# Patient Record
Sex: MALE | Race: WHITE | NOT HISPANIC OR LATINO
[De-identification: names, ages, dates, MRNs, and addresses within clinical notes are randomized per-mention and may not be internally consistent; named-entity substitution may affect disease eponyms.]

---

## 2017-10-23 ENCOUNTER — APPOINTMENT (OUTPATIENT)
Dept: DERMATOLOGY | Facility: CLINIC | Age: 52
End: 2017-10-23
Payer: COMMERCIAL

## 2017-10-23 PROCEDURE — D0125: CPT

## 2017-10-23 PROCEDURE — 99214 OFFICE O/P EST MOD 30 MIN: CPT

## 2017-12-21 ENCOUNTER — APPOINTMENT (OUTPATIENT)
Dept: DERMATOLOGY | Facility: CLINIC | Age: 52
End: 2017-12-21

## 2017-12-29 ENCOUNTER — APPOINTMENT (OUTPATIENT)
Dept: DERMATOLOGY | Facility: CLINIC | Age: 52
End: 2017-12-29

## 2018-01-09 ENCOUNTER — APPOINTMENT (OUTPATIENT)
Dept: DERMATOLOGY | Facility: CLINIC | Age: 53
End: 2018-01-09

## 2018-04-10 ENCOUNTER — APPOINTMENT (OUTPATIENT)
Dept: DERMATOLOGY | Facility: CLINIC | Age: 53
End: 2018-04-10
Payer: COMMERCIAL

## 2018-04-10 PROCEDURE — 99212 OFFICE O/P EST SF 10 MIN: CPT

## 2018-08-07 ENCOUNTER — APPOINTMENT (OUTPATIENT)
Dept: DERMATOLOGY | Facility: CLINIC | Age: 53
End: 2018-08-07
Payer: COMMERCIAL

## 2018-08-07 PROCEDURE — 17110 DESTRUCTION B9 LES UP TO 14: CPT

## 2018-08-07 PROCEDURE — 99213 OFFICE O/P EST LOW 20 MIN: CPT | Mod: 25

## 2019-02-20 ENCOUNTER — RESULT REVIEW (OUTPATIENT)
Age: 54
End: 2019-02-20

## 2019-02-20 ENCOUNTER — APPOINTMENT (OUTPATIENT)
Dept: DERMATOLOGY | Facility: CLINIC | Age: 54
End: 2019-02-20
Payer: COMMERCIAL

## 2019-02-20 PROCEDURE — 17110 DESTRUCTION B9 LES UP TO 14: CPT

## 2019-02-20 PROCEDURE — 99214 OFFICE O/P EST MOD 30 MIN: CPT | Mod: 25

## 2019-02-20 PROCEDURE — 17000 DESTRUCT PREMALG LESION: CPT | Mod: 59

## 2019-02-20 PROCEDURE — 11102 TANGNTL BX SKIN SINGLE LES: CPT | Mod: 59

## 2019-02-20 PROCEDURE — 17003 DESTRUCT PREMALG LES 2-14: CPT

## 2019-04-18 ENCOUNTER — APPOINTMENT (OUTPATIENT)
Dept: DERMATOLOGY | Facility: CLINIC | Age: 54
End: 2019-04-18
Payer: COMMERCIAL

## 2019-04-18 DIAGNOSIS — C44.310 BASAL CELL CARCINOMA OF SKIN OF UNSPECIFIED PARTS OF FACE: ICD-10-CM

## 2019-04-18 PROCEDURE — 17312 MOHS ADDL STAGE: CPT

## 2019-04-18 PROCEDURE — 13132 CMPLX RPR F/C/C/M/N/AX/G/H/F: CPT

## 2019-04-18 PROCEDURE — 17311 MOHS 1 STAGE H/N/HF/G: CPT

## 2019-07-01 ENCOUNTER — APPOINTMENT (OUTPATIENT)
Dept: DERMATOLOGY | Facility: CLINIC | Age: 54
End: 2019-07-01
Payer: COMMERCIAL

## 2019-07-01 PROCEDURE — 17000 DESTRUCT PREMALG LESION: CPT

## 2019-07-01 PROCEDURE — 99214 OFFICE O/P EST MOD 30 MIN: CPT | Mod: 25

## 2019-09-04 ENCOUNTER — APPOINTMENT (OUTPATIENT)
Dept: ORTHOPEDIC SURGERY | Facility: CLINIC | Age: 54
End: 2019-09-04
Payer: COMMERCIAL

## 2019-09-04 VITALS
DIASTOLIC BLOOD PRESSURE: 79 MMHG | BODY MASS INDEX: 31.52 KG/M2 | WEIGHT: 208 LBS | HEART RATE: 76 BPM | HEIGHT: 68 IN | SYSTOLIC BLOOD PRESSURE: 165 MMHG

## 2019-09-04 DIAGNOSIS — Z80.9 FAMILY HISTORY OF MALIGNANT NEOPLASM, UNSPECIFIED: ICD-10-CM

## 2019-09-04 DIAGNOSIS — M79.89 OTHER SPECIFIED SOFT TISSUE DISORDERS: ICD-10-CM

## 2019-09-04 DIAGNOSIS — Z78.9 OTHER SPECIFIED HEALTH STATUS: ICD-10-CM

## 2019-09-04 PROCEDURE — 99203 OFFICE O/P NEW LOW 30 MIN: CPT

## 2019-09-11 PROBLEM — Z80.9 FAMILY HISTORY OF MALIGNANT NEOPLASM: Status: ACTIVE | Noted: 2019-09-09

## 2019-09-11 PROBLEM — Z78.9 NO PERTINENT PAST MEDICAL HISTORY: Status: RESOLVED | Noted: 2019-09-09 | Resolved: 2019-09-11
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
## 2019-09-11 NOTE — PHYSICAL EXAM
[de-identified] : Ambulating with a slightly antalgic to antalgic gait.  Station:  Normal.  [de-identified] : Left Calf:\par The patient does have swelling to the calf.  \par \par Left Knee: \par Range of Motion in Degrees	\par 	                  Claimant:	Normal:	\par Flexion Active	  135 	                135-degrees	\par Flexion Passive	  135	                135-degrees	\par Extension Active	  0-5	                0-5-degrees	\par Extension Passive	  0-5	                0-5-degrees	\par \par No weakness to flexion/extension.  No evidence of instability in the AP plane or varus or valgus stress.  Negative  Lachman.  Negative pivot shift.  Negative anterior drawer test.  Negative posterior drawer test.  Negative Cristiano.  Negative Apley grind.  No medial or lateral joint line tenderness.  No tenderness over the medial and lateral facet of the patella.  No patellofemoral crepitations.  No lateral tilting patella.  No patellar apprehension.  No crepitation in the medial and lateral femoral condyle.  No proximal or distal swelling, edema or tenderness.  No gross motor or sensory deficits.  No intra-articular swelling.  2+ DP and PT pulses. No varus or valgus malalignment.  Skin is intact.  No rashes, scars or lesions.  \par  \par Left Ankle: \par Range of Motion in Degrees:\par 	                                Claimant:	                Normal:	\par Dorsiflexion (Active)	40-degrees	40-degrees	\par Dorsiflexion (Passive)	40-degrees	40-degrees	\par Plantar (Active)	                40-degrees	40-degrees	\par Plantar (Passive)	                40-degrees	40-degrees	\par Inversion (Active)	                30-degrees	30-degrees	\par Inversion (Passive)	                30-degrees	30-degrees	\par Eversion  (Active)	                20-degrees	20-degrees	\par Eversion (Passive) 	                20-degrees	20-degrees	\par \par No weakness in dorsiflexion, plantar flexion, inversion or eversion.  Normal sensation.  No tenderness over the medial or lateral ligaments.  No tenderness over the DLES.  No evidence of instability.  Negative anterior drawer sign.  No medial or lateral bony tenderness.  No proximal fibular tenderness.  No anterior, posterior, medial or lateral tendon tenderness.  No intra-articular swelling.  No extra-articular swelling, edema or tenderness.  No tenderness over the plantar aspect of the os calcis.  2+ DP and PT pulses. Skin is intact.  No rashes, scars or lesions.  \par \par Right Knee: \par Range of Motion in Degrees	\par 	                  Claimant:	Normal:	\par Flexion Active	  135 	                135-degrees	\par Flexion Passive	  135	                135-degrees	\par Extension Active	  0-5	                0-5-degrees	\par Extension Passive	  0-5	                0-5-degrees	\par \par No weakness to flexion/extension.  No evidence of instability in the AP plane or varus or valgus stress.  Negative  Lachman.  Negative pivot shift.  Negative anterior drawer test.  Negative posterior drawer test.  Negative Cristiano.  Negative Apley grind.  No medial or lateral joint line tenderness.  No tenderness over the medial and lateral facet of the patella.  No patellofemoral crepitations.  No lateral tilting patella.  No patellar apprehension.  No crepitation in the medial and lateral femoral condyle.  No proximal or distal swelling, edema or tenderness.  No gross motor or sensory deficits.  No intra-articular swelling.  2+ DP and PT pulses. No varus or valgus malalignment.  Skin is intact.  No rashes, scars or lesions.  \par  \par Right Ankle: \par Range of Motion in Degrees:\par 	                                Claimant:	                Normal:	\par Dorsiflexion (Active)	40-degrees	40-degrees	\par Dorsiflexion (Passive)	40-degrees	40-degrees	\par Plantar (Active)	                40-degrees	40-degrees	\par Plantar (Passive)	                40-degrees	40-degrees	\par Inversion (Active)	                30-degrees	30-degrees	\par Inversion (Passive)	                30-degrees	30-degrees	\par Eversion  (Active)	                20-degrees	20-degrees	\par Eversion (Passive) 	                20-degrees	20-degrees	\par \par No weakness in dorsiflexion, plantar flexion, inversion or eversion.  Normal sensation.  No tenderness over the medial or lateral ligaments.  No tenderness over the DLES.  No evidence of instability.  Negative anterior drawer sign.  No medial or lateral bony tenderness.  No proximal fibular tenderness.  No anterior, posterior, medial or lateral tendon tenderness.  No intra-articular swelling.  No extra-articular swelling, edema or tenderness.  No tenderness over the plantar aspect of the os calcis.  2+ DP and PT pulses. Skin is intact.  No rashes, scars or lesions.  \par   [de-identified] : Appearance:  Well-developed, well-nourished male in no acute distress.\par \par   [de-identified] : [facility] and knee, were all negative for any fractures or dislocations.  Doppler was negative.
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
## 2019-09-11 NOTE — ADDENDUM
[FreeTextEntry1] : This note was written by Alycia Payton on 09/11/2019 acting as a scribe for ABRAN CHOWDHURY

## 2019-12-07 ENCOUNTER — EMERGENCY (EMERGENCY)
Facility: HOSPITAL | Age: 54
LOS: 1 days | Discharge: HOME | End: 2019-12-07
Attending: EMERGENCY MEDICINE
Payer: COMMERCIAL

## 2019-12-07 VITALS
SYSTOLIC BLOOD PRESSURE: 111 MMHG | DIASTOLIC BLOOD PRESSURE: 70 MMHG | HEIGHT: 67 IN | RESPIRATION RATE: 20 BRPM | HEART RATE: 64 BPM | TEMPERATURE: 97 F | WEIGHT: 207.9 LBS | OXYGEN SATURATION: 97 %

## 2019-12-07 LAB
ALBUMIN SERPL ELPH-MCNC: 4.2 G/DL — SIGNIFICANT CHANGE UP (ref 3.3–5.2)
ALP SERPL-CCNC: 77 U/L — SIGNIFICANT CHANGE UP (ref 40–120)
ALT FLD-CCNC: 53 U/L — HIGH
ANION GAP SERPL CALC-SCNC: 14 MMOL/L — SIGNIFICANT CHANGE UP (ref 5–17)
APTT BLD: 30.9 SEC — SIGNIFICANT CHANGE UP (ref 27.5–36.3)
AST SERPL-CCNC: 35 U/L — SIGNIFICANT CHANGE UP
BASOPHILS # BLD AUTO: 0.04 K/UL — SIGNIFICANT CHANGE UP (ref 0–0.2)
BASOPHILS NFR BLD AUTO: 0.8 % — SIGNIFICANT CHANGE UP (ref 0–2)
BILIRUB SERPL-MCNC: 0.3 MG/DL — LOW (ref 0.4–2)
BLD GP AB SCN SERPL QL: SIGNIFICANT CHANGE UP
BUN SERPL-MCNC: 21 MG/DL — HIGH (ref 8–20)
CALCIUM SERPL-MCNC: 9.4 MG/DL — SIGNIFICANT CHANGE UP (ref 8.6–10.2)
CHLORIDE SERPL-SCNC: 96 MMOL/L — LOW (ref 98–107)
CO2 SERPL-SCNC: 25 MMOL/L — SIGNIFICANT CHANGE UP (ref 22–29)
CREAT SERPL-MCNC: 0.87 MG/DL — SIGNIFICANT CHANGE UP (ref 0.5–1.3)
EOSINOPHIL # BLD AUTO: 0.33 K/UL — SIGNIFICANT CHANGE UP (ref 0–0.5)
EOSINOPHIL NFR BLD AUTO: 6.4 % — HIGH (ref 0–6)
ETHANOL SERPL-MCNC: 279 MG/DL — SIGNIFICANT CHANGE UP
GLUCOSE SERPL-MCNC: 100 MG/DL — SIGNIFICANT CHANGE UP (ref 70–115)
HCT VFR BLD CALC: 40.5 % — SIGNIFICANT CHANGE UP (ref 39–50)
HGB BLD-MCNC: 13.8 G/DL — SIGNIFICANT CHANGE UP (ref 13–17)
IMM GRANULOCYTES NFR BLD AUTO: 0.4 % — SIGNIFICANT CHANGE UP (ref 0–1.5)
INR BLD: 1.06 RATIO — SIGNIFICANT CHANGE UP (ref 0.88–1.16)
LIDOCAIN IGE QN: 39 U/L — SIGNIFICANT CHANGE UP (ref 22–51)
LYMPHOCYTES # BLD AUTO: 1.54 K/UL — SIGNIFICANT CHANGE UP (ref 1–3.3)
LYMPHOCYTES # BLD AUTO: 30.1 % — SIGNIFICANT CHANGE UP (ref 13–44)
MCHC RBC-ENTMCNC: 29.3 PG — SIGNIFICANT CHANGE UP (ref 27–34)
MCHC RBC-ENTMCNC: 34.1 GM/DL — SIGNIFICANT CHANGE UP (ref 32–36)
MCV RBC AUTO: 86 FL — SIGNIFICANT CHANGE UP (ref 80–100)
MONOCYTES # BLD AUTO: 0.52 K/UL — SIGNIFICANT CHANGE UP (ref 0–0.9)
MONOCYTES NFR BLD AUTO: 10.2 % — SIGNIFICANT CHANGE UP (ref 2–14)
NEUTROPHILS # BLD AUTO: 2.67 K/UL — SIGNIFICANT CHANGE UP (ref 1.8–7.4)
NEUTROPHILS NFR BLD AUTO: 52.1 % — SIGNIFICANT CHANGE UP (ref 43–77)
PLATELET # BLD AUTO: 209 K/UL — SIGNIFICANT CHANGE UP (ref 150–400)
POTASSIUM SERPL-MCNC: 4.8 MMOL/L — SIGNIFICANT CHANGE UP (ref 3.5–5.3)
POTASSIUM SERPL-SCNC: 4.8 MMOL/L — SIGNIFICANT CHANGE UP (ref 3.5–5.3)
PROT SERPL-MCNC: 7.3 G/DL — SIGNIFICANT CHANGE UP (ref 6.6–8.7)
PROTHROM AB SERPL-ACNC: 12.2 SEC — SIGNIFICANT CHANGE UP (ref 10–12.9)
RBC # BLD: 4.71 M/UL — SIGNIFICANT CHANGE UP (ref 4.2–5.8)
RBC # FLD: 11.8 % — SIGNIFICANT CHANGE UP (ref 10.3–14.5)
SODIUM SERPL-SCNC: 135 MMOL/L — SIGNIFICANT CHANGE UP (ref 135–145)
WBC # BLD: 5.12 K/UL — SIGNIFICANT CHANGE UP (ref 3.8–10.5)
WBC # FLD AUTO: 5.12 K/UL — SIGNIFICANT CHANGE UP (ref 3.8–10.5)

## 2019-12-07 PROCEDURE — 85610 PROTHROMBIN TIME: CPT

## 2019-12-07 PROCEDURE — 74177 CT ABD & PELVIS W/CONTRAST: CPT

## 2019-12-07 PROCEDURE — 85027 COMPLETE CBC AUTOMATED: CPT

## 2019-12-07 PROCEDURE — 99284 EMERGENCY DEPT VISIT MOD MDM: CPT

## 2019-12-07 PROCEDURE — 70450 CT HEAD/BRAIN W/O DYE: CPT

## 2019-12-07 PROCEDURE — 83690 ASSAY OF LIPASE: CPT

## 2019-12-07 PROCEDURE — 70450 CT HEAD/BRAIN W/O DYE: CPT | Mod: 26

## 2019-12-07 PROCEDURE — 71260 CT THORAX DX C+: CPT | Mod: 26

## 2019-12-07 PROCEDURE — 72125 CT NECK SPINE W/O DYE: CPT

## 2019-12-07 PROCEDURE — 71260 CT THORAX DX C+: CPT

## 2019-12-07 PROCEDURE — 85730 THROMBOPLASTIN TIME PARTIAL: CPT

## 2019-12-07 PROCEDURE — 80307 DRUG TEST PRSMV CHEM ANLYZR: CPT

## 2019-12-07 PROCEDURE — 86901 BLOOD TYPING SEROLOGIC RH(D): CPT

## 2019-12-07 PROCEDURE — 74177 CT ABD & PELVIS W/CONTRAST: CPT | Mod: 26

## 2019-12-07 PROCEDURE — 86900 BLOOD TYPING SEROLOGIC ABO: CPT

## 2019-12-07 PROCEDURE — 72125 CT NECK SPINE W/O DYE: CPT | Mod: 26

## 2019-12-07 PROCEDURE — 86850 RBC ANTIBODY SCREEN: CPT

## 2019-12-07 PROCEDURE — 80053 COMPREHEN METABOLIC PANEL: CPT

## 2019-12-07 PROCEDURE — 99285 EMERGENCY DEPT VISIT HI MDM: CPT | Mod: 25

## 2019-12-07 PROCEDURE — 36415 COLL VENOUS BLD VENIPUNCTURE: CPT
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
## 2019-12-07 NOTE — ED PROVIDER NOTE - PATIENT PORTAL LINK FT
You can access the FollowMyHealth Patient Portal offered by [facility] by registering at the following website: [email or web address]. By joining [facility]’s FollowMyHealth portal, you will also be able to view your health information using other applications (apps) compatible with our system.
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
## 2019-12-07 NOTE — ED PROVIDER NOTE - PROGRESS NOTE DETAILS
Saleem: CT imaging without any traumatic injuries. Incidentally noted RUL nodules, follow up recommended by radiology, pt made aware of these findings and provided with results. Pt ambulatory with a steady gait and clinically sober. Being picked up by friend to get home.

## 2020-01-02 ENCOUNTER — APPOINTMENT (OUTPATIENT)
Dept: DERMATOLOGY | Facility: CLINIC | Age: 55
End: 2020-01-02
Payer: COMMERCIAL

## 2020-01-02 PROBLEM — I10 ESSENTIAL (PRIMARY) HYPERTENSION: Chronic | Status: ACTIVE | Noted: 2019-12-07

## 2020-01-02 PROCEDURE — 99213 OFFICE O/P EST LOW 20 MIN: CPT | Mod: 25

## 2020-01-02 PROCEDURE — 17003 DESTRUCT PREMALG LES 2-14: CPT

## 2020-01-02 PROCEDURE — 17000 DESTRUCT PREMALG LESION: CPT

## 2020-06-23 ENCOUNTER — APPOINTMENT (OUTPATIENT)
Dept: DERMATOLOGY | Facility: CLINIC | Age: 55
End: 2020-06-23
Payer: COMMERCIAL

## 2020-06-23 PROCEDURE — 17003 DESTRUCT PREMALG LES 2-14: CPT

## 2020-06-23 PROCEDURE — 17000 DESTRUCT PREMALG LESION: CPT

## 2020-06-23 PROCEDURE — 99213 OFFICE O/P EST LOW 20 MIN: CPT | Mod: 25

## 2020-10-22 NOTE — ED ADULT TRIAGE NOTE - TEMPERATURE IN CELSIUS (DEGREES C)
----- Message from Nate Vu DO sent at 10/22/2020  7:55 AM CDT -----  The 10-year ASCVD risk score (Kathryn SY Jr., et al., 2013) is: 15.4%    Values used to calculate the score:      Age: 72 years      Sex: Male      Is Non- : No      Diabetic: No      Tobacco smoker: No      Systolic Blood Pressure: 112 mmHg      Is BP treated: No      HDL Cholesterol: 62 mg/dL      Total Cholesterol: 169 mg/dL    Would benefit from statin. We can discuss at next visit. Or if he would like to start atorvastatin 40 mg qhs please order 90 with 3 refills.   36.2

## 2020-12-31 ENCOUNTER — APPOINTMENT (OUTPATIENT)
Dept: DERMATOLOGY | Facility: CLINIC | Age: 55
End: 2020-12-31
Payer: COMMERCIAL

## 2020-12-31 PROCEDURE — 99214 OFFICE O/P EST MOD 30 MIN: CPT

## 2020-12-31 PROCEDURE — 99072 ADDL SUPL MATRL&STAF TM PHE: CPT

## 2021-07-20 ENCOUNTER — APPOINTMENT (OUTPATIENT)
Dept: DERMATOLOGY | Facility: CLINIC | Age: 56
End: 2021-07-20

## 2021-07-26 ENCOUNTER — APPOINTMENT (OUTPATIENT)
Dept: DERMATOLOGY | Facility: CLINIC | Age: 56
End: 2021-07-26
Payer: COMMERCIAL

## 2021-07-26 PROCEDURE — 99213 OFFICE O/P EST LOW 20 MIN: CPT

## 2021-08-03 ENCOUNTER — APPOINTMENT (OUTPATIENT)
Dept: DERMATOLOGY | Facility: CLINIC | Age: 56
End: 2021-08-03

## 2022-01-04 NOTE — ED PROVIDER NOTE - DR. NAME
SCr 1.57 on admission  baseline SCr ~ 0.8  s/p 2.6 L NS bolus  Monitor BMP  Consider Renal US, urine lytes if not improving with IV hydration Saleem

## 2022-01-13 ENCOUNTER — APPOINTMENT (OUTPATIENT)
Dept: DERMATOLOGY | Facility: CLINIC | Age: 57
End: 2022-01-13

## 2022-07-19 ENCOUNTER — APPOINTMENT (OUTPATIENT)
Dept: DERMATOLOGY | Facility: CLINIC | Age: 57
End: 2022-07-19

## 2022-07-19 PROCEDURE — 17003 DESTRUCT PREMALG LES 2-14: CPT

## 2022-07-19 PROCEDURE — 99214 OFFICE O/P EST MOD 30 MIN: CPT | Mod: 25

## 2022-07-19 PROCEDURE — 17000 DESTRUCT PREMALG LESION: CPT

## 2022-07-20 ENCOUNTER — APPOINTMENT (OUTPATIENT)
Dept: ORTHOPEDIC SURGERY | Facility: CLINIC | Age: 57
End: 2022-07-20

## 2022-07-20 VITALS — BODY MASS INDEX: 31.52 KG/M2 | WEIGHT: 208 LBS | HEIGHT: 68 IN

## 2022-07-20 DIAGNOSIS — M19.90 UNSPECIFIED OSTEOARTHRITIS, UNSPECIFIED SITE: ICD-10-CM

## 2022-07-20 DIAGNOSIS — M76.822 POSTERIOR TIBIAL TENDINITIS, LEFT LEG: ICD-10-CM

## 2022-07-20 DIAGNOSIS — Z00.00 ENCOUNTER FOR GENERAL ADULT MEDICAL EXAMINATION W/OUT ABNORMAL FINDINGS: ICD-10-CM

## 2022-07-20 DIAGNOSIS — M25.475 EFFUSION, LEFT FOOT: ICD-10-CM

## 2022-07-20 PROCEDURE — 73630 X-RAY EXAM OF FOOT: CPT | Mod: LT

## 2022-07-20 PROCEDURE — 73600 X-RAY EXAM OF ANKLE: CPT | Mod: LT

## 2022-07-20 PROCEDURE — 99204 OFFICE O/P NEW MOD 45 MIN: CPT

## 2022-07-20 RX ORDER — METHYLPREDNISOLONE 4 MG/1
4 TABLET ORAL
Qty: 1 | Refills: 0 | Status: ACTIVE | COMMUNITY
Start: 2022-07-20 | End: 1900-01-01

## 2022-07-20 NOTE — PHYSICAL EXAM
[Left] : left foot and ankle [Mild] : mild swelling of MTP joint/great toe [1st] : 1st [NL (40)] : plantar flexion 40 degrees [NL 30)] : inversion 30 degrees [NL (20)] : eversion 20 degrees [5___] : [facility] 5[unfilled]/5 [2+] : dorsalis pedis pulse: 2+ [] : patient ambulates without assistive device [de-identified] : MTP joint DF 30 degrees [TWNoteComboBox6] : MTP joint PF 10 degrees

## 2022-07-20 NOTE — IMAGING
[Weight -] : weightbearing [Left] : left foot [There are no fractures, subluxations or dislocations. No significant abnormalities are seen] : There are no fractures, subluxations or dislocations. No significant abnormalities are seen

## 2022-07-20 NOTE — HISTORY OF PRESENT ILLNESS
[8] : 8 [0] : 0 [Burning] : burning [Constant] : constant [de-identified] : 7/20/22: few months foot pain around great toe. no recent injury. h/o foot oa. denies h/o gout. 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

## 2022-07-20 NOTE — ASSESSMENT
[FreeTextEntry1] : concern for gout\par rx for labs given\par mdp\par f/up 2 wks\par \par A Medrol dose Yariel was prescribed today. Patient understands that while taking the Medrol, they shouldn't be taking any other anti-inflammatories. Pt understands and all questions were answered.\par \par

## 2022-07-29 ENCOUNTER — APPOINTMENT (OUTPATIENT)
Dept: ORTHOPEDIC SURGERY | Facility: CLINIC | Age: 57
End: 2022-07-29

## 2022-08-03 ENCOUNTER — APPOINTMENT (OUTPATIENT)
Dept: ORTHOPEDIC SURGERY | Facility: CLINIC | Age: 57
End: 2022-08-03

## 2022-09-29 ENCOUNTER — APPOINTMENT (OUTPATIENT)
Dept: DERMATOLOGY | Facility: CLINIC | Age: 57
End: 2022-09-29

## 2022-09-29 PROCEDURE — 99213 OFFICE O/P EST LOW 20 MIN: CPT | Mod: 25

## 2022-09-29 PROCEDURE — 17110 DESTRUCTION B9 LES UP TO 14: CPT

## 2022-09-29 PROCEDURE — 17000 DESTRUCT PREMALG LESION: CPT | Mod: 59

## 2022-10-27 ENCOUNTER — APPOINTMENT (OUTPATIENT)
Dept: DERMATOLOGY | Facility: CLINIC | Age: 57
End: 2022-10-27

## 2022-10-27 PROCEDURE — 99213 OFFICE O/P EST LOW 20 MIN: CPT | Mod: 25

## 2022-10-27 PROCEDURE — 11900 INJECT SKIN LESIONS </W 7: CPT

## 2023-04-14 ENCOUNTER — RESULT REVIEW (OUTPATIENT)
Age: 58
End: 2023-04-14

## 2023-04-14 ENCOUNTER — APPOINTMENT (OUTPATIENT)
Dept: DERMATOLOGY | Facility: CLINIC | Age: 58
End: 2023-04-14
Payer: COMMERCIAL

## 2023-04-14 PROCEDURE — 11102 TANGNTL BX SKIN SINGLE LES: CPT

## 2023-04-14 PROCEDURE — 99213 OFFICE O/P EST LOW 20 MIN: CPT | Mod: 25

## 2023-07-13 ENCOUNTER — APPOINTMENT (OUTPATIENT)
Dept: DERMATOLOGY | Facility: CLINIC | Age: 58
End: 2023-07-13

## 2023-07-20 ENCOUNTER — APPOINTMENT (OUTPATIENT)
Dept: DERMATOLOGY | Facility: CLINIC | Age: 58
End: 2023-07-20
Payer: COMMERCIAL

## 2023-07-20 ENCOUNTER — RESULT REVIEW (OUTPATIENT)
Age: 58
End: 2023-07-20

## 2023-07-20 PROCEDURE — 99213 OFFICE O/P EST LOW 20 MIN: CPT | Mod: 25

## 2023-07-20 PROCEDURE — 11102 TANGNTL BX SKIN SINGLE LES: CPT

## 2023-08-21 ENCOUNTER — APPOINTMENT (OUTPATIENT)
Dept: DERMATOLOGY | Facility: CLINIC | Age: 58
End: 2023-08-21
Payer: COMMERCIAL

## 2023-08-21 DIAGNOSIS — C43.59 MALIGNANT MELANOMA OF OTHER PART OF TRUNK: ICD-10-CM

## 2023-08-21 PROCEDURE — 12032 INTMD RPR S/A/T/EXT 2.6-7.5: CPT

## 2023-08-21 PROCEDURE — 11603 EXC TR-EXT MAL+MARG 2.1-3 CM: CPT

## 2023-08-21 RX ORDER — CEPHALEXIN 500 MG/1
500 CAPSULE ORAL 3 TIMES DAILY
Qty: 21 | Refills: 0 | Status: ACTIVE | COMMUNITY
Start: 2023-08-21 | End: 1900-01-01

## 2023-08-21 NOTE — END OF VISIT
[] : Resident [FreeTextEntry3] : I personally saw and examined this patient with the resident physician and was present for the key portions of history taking, examination as well as the excision and reconstruction procedures performed .  I agree with the assessment and plan as documented in the resident's note unless noted below.   Lizette Leblanc MD Physician, Dermatology and Dermatologic Surgery [facility]

## 2023-08-21 NOTE — ASSESSMENT
[FreeTextEntry1] : Melanoma, 0.3 mm in thickness, pT1a of the left superior upper back --excision with 1 cm margins performed today, will follow up on final pathology  -- intermediate linear repair performed  -- f/u for wound check PRN  -- keflex 500 mg TID x 7 days, SED  -- f/u for routine skin exams as previously recommended by referring dermatologist   Thank you for this dermatologic surgery referral.   Reason MD Deana  Physician, Dermatology & Dermatologic Surgery  [facility]

## 2023-08-21 NOTE — PROCEDURE
[TextEntry] : Excision Operative Note  Indications:  Alternative therapies were discussed. Given the size, location, and tumor type we decided that excision offers the best option for treatment.  Preoperative diagnosis: melanoma Postoperative diagnosis: Same Location: Left superior upper back  Anesthetic: 1% lidocaine with 1:100,000 epinephrine Antiseptic: Chlorhexidine or Betadine Attending surgeon: Lizette Leblanc MD Assistant(s): Keyon Atkinson RN, Rhianna Soler MA Estimated blood loss: < 5cc  Complications: None Initial lesion size: 0.9 cm x 0.8 cm Surgical margin: 1.0 cm Total excision size (always includes surgical margin):  2.9 x 2.8 cm Closure type: Intermediate linear layered Length of closure: 5.4 cm Suture material: 3-0 Vicryl, 4-0 Chromic   The patient was brought back to the minor surgery operating room. Prior to the procedure the medical record was reviewed by the physician. A pre-procedure checklist in the presence of the patient was reviewed. A surgery " timeout " was observed. The following were confirmed during the surgery timeout: patient name, confirmation of consent, patient position, allergies, type of anesthesia, and antibiotic given (if any, see emr entry for any medications).  The risks of the procedure, including bleeding, infection, nerve damage, possibility of recurrence, failure of the repair, and the certainty of a scar were discussed with the patient. Alternatives to the procedure, as well as their risks and benefits, were also discussed. The patient was offered an opportunity to ask any questions and, after expressing understanding of the proposed procedure and its alternatives, the patient signed the consent form. The patient was placed in appropriate position and the area was prepared and draped in the usual manner. Local anesthesia was obtained with the above mentioned anesthetic. Using a sterile surgical marking pen, an elliptical (or circular) excision was designed around the lesion and along relaxed skin tension lines, if possible, incorporating the margin of normal-appearing skin mentioned above. A full thickness skin incision using a #15 blade Bard-Matthew scalpel was performed and the lesion was excised sharply in the subcutaneous plane.  The specimen was submitted for histopathologic examination.  The defect was moderately undermined in the subcutaneous plane if needed to reduce wound closure tension and allow for easy wound edge eversion.  Hemostasis was maintained with the electrosurgical unit.  Interrupted, inverted, subcuticular buried mattress sutures were placed using the material mentioned above to approximate the dermal edges of the wound. Interrupted and running simple cutaneous sutures were used to further approximate and sylvain the wound edges.  The final length of the repair is listed in the summary above. A firm pressure dressing was applied over vaseline ointment and Telfa. Verbal postoperative wound care instructions were given and a wound care hand out was dispensed.  The patient was asked to follow up for a wound check as specified. The patient was also told to call us at anytime for signs of wound infection or any other concerns they might have regarding the surgery or the healing process.  The patient tolerated the procedure well and ambulated from the operatory without problem.

## 2023-08-21 NOTE — HISTORY OF PRESENT ILLNESS
[FreeTextEntry1] : Melanoma, 0.3 mm in thickness, pT1a of the left superior upper back [de-identified] : Dermatologic Surgery Consultation  08/21/2023  Referred by: Dr. Owen    We had the pleasure of seeing your patient in consultation for Dermatologic Surgery.   Mr. SAULO LIZAMA is a 58 year old M who presents for excision of melanoma, 0.3 mm in thickness, pT1a of the left superior upper back.    Pertinent positives noted below History of HIV or hepatitis: No Blood thinners: No Antibiotic Prophylaxis: None Medical implants: None    The patient's review of systems questionnaire was reviewed. Education needs were identified. There were no barriers to learning.   Dermatologic surgery consultation for melanoma, 0.3mm, Stage pT1a  -- I explained the treatment recommendation of excision with 10 mm margins according to NCCN guidelines -I advised that final pathologic staging is only possible after evaluation of the complete specimen following excision, and may result in a change to the final stage of the melanoma and further treatment and/or workup recommendations including additional excision as well as discussion of SLNB if indicated according to final tumor stage -I explained that following extirpation there will be a full thickness defect of the involved area. The reconstructive options will be based on the defect size and surrounding tissue laxity of the involved area. Primary closure is only possible for smaller defects. For larger defects, local tissue rearrangement or skin grafting may be necessary. Risks following layered primary closure or local tissue rearrangement include wound dehiscence, contour irregularity, bleeding, infection, and paresthesia (nerve damage including sensory deficit or motor weakness). Risks following skin grafting include wound dehiscence, skin graft nonadherence (partial or complete), contour irregularity, bleeding, infection, paresthesia, and donor site complications. I explained that the patient will need to abstain from physical activity for 1-2 weeks following the surgery, that they would heal with a scar, and also discussed the chances of infection, bleeding, potential sensory or motor nerve damage, and recurrence.  The patient indicated that s/he understood the risks and wished to proceed today -- In particular, for reconstruction we discussed linear closure  Thank you for this dermatologic surgery referral. We recommended that the patient follow up with their r referring dermatologist for routine skin exams following surgery.

## 2023-08-21 NOTE — PHYSICAL EXAM
[Alert] : alert [Oriented x 3] : ~L oriented x 3 [Well Nourished] : well nourished [Conjunctiva Non-injected] : conjunctiva non-injected [No Visual Lymphadenopathy] : no visual  lymphadenopathy [No Clubbing] : no clubbing [No Edema] : no edema [No Bromhidrosis] : no bromhidrosis [No Chromhidrosis] : no chromhidrosis [Hair] : Hair [Scalp] : Scalp [Face] : Face [Nose] : Nose [Eyelids] : Eyelids [Ears] : Ears [Lips] : Lips [Neck] : Neck [FreeTextEntry3] : -0.9x0.8 cm pink scar of the Left superior upper back  no LAD

## 2023-09-05 ENCOUNTER — RX ONLY (RX ONLY)
Age: 58
End: 2023-09-05

## 2023-09-05 ENCOUNTER — OFFICE (OUTPATIENT)
Dept: URBAN - METROPOLITAN AREA CLINIC 104 | Facility: CLINIC | Age: 58
Setting detail: OPHTHALMOLOGY
End: 2023-09-05
Payer: COMMERCIAL

## 2023-09-05 DIAGNOSIS — H01.001: ICD-10-CM

## 2023-09-05 DIAGNOSIS — H04.122: ICD-10-CM

## 2023-09-05 DIAGNOSIS — H01.002: ICD-10-CM

## 2023-09-05 DIAGNOSIS — H01.004: ICD-10-CM

## 2023-09-05 DIAGNOSIS — H04.123: ICD-10-CM

## 2023-09-05 PROBLEM — H35.033 HYPERTENSIVE RETINOPATHY; BOTH EYES: Status: ACTIVE | Noted: 2023-09-05

## 2023-09-05 PROBLEM — H02.132 ECTROPION-SENILE; RIGHT LOWER LID, LEFT LOWER LID: Status: ACTIVE | Noted: 2023-09-05

## 2023-09-05 PROBLEM — H01.005 BLEPHARITIS; RIGHT UPPER LID, RIGHT LOWER LID, LEFT UPPER LID, LEFT LOWER LID: Status: ACTIVE | Noted: 2023-09-05

## 2023-09-05 PROBLEM — H25.13 CATARACT SENILE NUCLEAR SCLEROSIS; BOTH EYES: Status: ACTIVE | Noted: 2023-09-05

## 2023-09-05 PROBLEM — H02.135 ECTROPION-SENILE; RIGHT LOWER LID, LEFT LOWER LID: Status: ACTIVE | Noted: 2023-09-05

## 2023-09-05 PROCEDURE — 92004 COMPRE OPH EXAM NEW PT 1/>: CPT | Performed by: SPECIALIST

## 2023-09-05 PROCEDURE — 83861 MICROFLUID ANALY TEARS: CPT | Performed by: SPECIALIST

## 2023-09-05 ASSESSMENT — TEAR BREAK UP TIME (TBUT)
OD_TBUT: 1+
OS_TBUT: 1+

## 2023-09-05 ASSESSMENT — LID EXAM ASSESSMENTS
OS_BLEPHARITIS: LLL LUL 2+
OD_BLEPHARITIS: RLL RUL 2+

## 2023-09-05 ASSESSMENT — REFRACTION_CURRENTRX
OD_SPHERE: +1.00
OS_SPHERE: +1.00
OS_OVR_VA: 20/
OS_AXIS: 120
OD_AXIS: 71
OS_CYLINDER: -1.50
OD_CYLINDER: -1.50
OD_OVR_VA: 20/

## 2023-09-05 ASSESSMENT — REFRACTION_AUTOREFRACTION
OD_SPHERE: +0.25
OS_SPHERE: PLANO
OD_CYLINDER: -1.50
OS_CYLINDER: -1.00
OS_AXIS: 79
OD_AXIS: 95

## 2023-09-05 ASSESSMENT — TONOMETRY
OS_IOP_MMHG: 15
OD_IOP_MMHG: 15

## 2023-09-05 ASSESSMENT — LID POSITION - ECTROPION
OD_ECTROPION: RLL 2+
OS_ECTROPION: LLL 2+

## 2023-09-05 ASSESSMENT — SPHEQUIV_DERIVED: OD_SPHEQUIV: -0.5

## 2023-09-05 ASSESSMENT — VISUAL ACUITY
OS_BCVA: 20/20
OD_BCVA: 20/20

## 2023-09-05 ASSESSMENT — CONFRONTATIONAL VISUAL FIELD TEST (CVF)
OS_FINDINGS: FULL
OD_FINDINGS: FULL

## 2023-09-13 ENCOUNTER — NON-APPOINTMENT (OUTPATIENT)
Age: 58
End: 2023-09-13

## 2023-12-12 ENCOUNTER — APPOINTMENT (OUTPATIENT)
Dept: DERMATOLOGY | Facility: CLINIC | Age: 58
End: 2023-12-12
Payer: COMMERCIAL

## 2023-12-12 PROCEDURE — 99213 OFFICE O/P EST LOW 20 MIN: CPT | Mod: 25

## 2023-12-12 PROCEDURE — 17000 DESTRUCT PREMALG LESION: CPT

## 2023-12-12 PROCEDURE — 17003 DESTRUCT PREMALG LES 2-14: CPT

## 2024-01-04 ENCOUNTER — APPOINTMENT (OUTPATIENT)
Dept: DERMATOLOGY | Facility: CLINIC | Age: 59
End: 2024-01-04
Payer: COMMERCIAL

## 2024-01-04 PROCEDURE — 99214 OFFICE O/P EST MOD 30 MIN: CPT | Mod: 25

## 2024-01-04 PROCEDURE — 17000 DESTRUCT PREMALG LESION: CPT

## 2024-04-12 NOTE — ED ADULT TRIAGE NOTE - PRO INTERPRETER NEED 2
I attest my time as attending is greater than 50% of the total combined time spent on qualifying patient care activities by the PA/NP and attending.
English

## 2024-04-17 ENCOUNTER — APPOINTMENT (OUTPATIENT)
Dept: DERMATOLOGY | Facility: CLINIC | Age: 59
End: 2024-04-17

## 2024-04-23 ENCOUNTER — APPOINTMENT (OUTPATIENT)
Dept: DERMATOLOGY | Facility: CLINIC | Age: 59
End: 2024-04-23
Payer: COMMERCIAL

## 2024-04-23 PROCEDURE — 17260 DSTRJ MAL LES T/A/L 0.5 CM/<: CPT

## 2024-04-23 PROCEDURE — 99213 OFFICE O/P EST LOW 20 MIN: CPT | Mod: 25

## 2024-07-25 ENCOUNTER — APPOINTMENT (OUTPATIENT)
Dept: DERMATOLOGY | Facility: CLINIC | Age: 59
End: 2024-07-25
Payer: COMMERCIAL

## 2024-07-25 PROCEDURE — 17000 DESTRUCT PREMALG LESION: CPT

## 2024-07-25 PROCEDURE — 17003 DESTRUCT PREMALG LES 2-14: CPT

## 2024-07-25 PROCEDURE — 99213 OFFICE O/P EST LOW 20 MIN: CPT | Mod: 25

## 2024-09-16 ENCOUNTER — APPOINTMENT (OUTPATIENT)
Dept: ORTHOPEDIC SURGERY | Facility: CLINIC | Age: 59
End: 2024-09-16

## 2024-10-22 ENCOUNTER — APPOINTMENT (OUTPATIENT)
Dept: DERMATOLOGY | Facility: CLINIC | Age: 59
End: 2024-10-22
Payer: COMMERCIAL

## 2024-10-22 PROCEDURE — 99213 OFFICE O/P EST LOW 20 MIN: CPT | Mod: 25

## 2024-10-22 PROCEDURE — 17000 DESTRUCT PREMALG LESION: CPT

## 2024-10-22 PROCEDURE — 17003 DESTRUCT PREMALG LES 2-14: CPT

## 2024-11-14 ENCOUNTER — RX ONLY (RX ONLY)
Age: 59
End: 2024-11-14

## 2024-11-14 ENCOUNTER — OFFICE (OUTPATIENT)
Dept: URBAN - METROPOLITAN AREA CLINIC 104 | Facility: CLINIC | Age: 59
Setting detail: OPHTHALMOLOGY
End: 2024-11-14
Payer: COMMERCIAL

## 2024-11-14 DIAGNOSIS — H04.123: ICD-10-CM

## 2024-11-14 DIAGNOSIS — H35.033: ICD-10-CM

## 2024-11-14 DIAGNOSIS — H25.13: ICD-10-CM

## 2024-11-14 PROCEDURE — 92014 COMPRE OPH EXAM EST PT 1/>: CPT | Performed by: SPECIALIST

## 2024-11-14 ASSESSMENT — REFRACTION_AUTOREFRACTION
OS_SPHERE: 0.00
OS_CYLINDER: -0.75
OD_SPHERE: -0.25
OS_AXIS: 090
OD_AXIS: 097
OD_CYLINDER: -1.25

## 2024-11-14 ASSESSMENT — TEAR BREAK UP TIME (TBUT)
OD_TBUT: 1+
OS_TBUT: 1+

## 2024-11-14 ASSESSMENT — KERATOMETRY
OS_AXISANGLE_DEGREES: 163
OS_K2POWER_DIOPTERS: 44.18
OD_K2POWER_DIOPTERS: 44.12
OS_K1POWER_DIOPTERS: 43.44
OD_K1POWER_DIOPTERS: 43.10
OD_AXISANGLE_DEGREES: 031

## 2024-11-14 ASSESSMENT — REFRACTION_CURRENTRX
OS_CYLINDER: -1.00
OS_SPHERE: +0.50
OD_AXIS: 092
OD_SPHERE: PLANO
OS_AXIS: 108
OD_OVR_VA: 20/
OS_OVR_VA: 20/
OD_CYLINDER: -0.50

## 2024-11-14 ASSESSMENT — TONOMETRY
OD_IOP_MMHG: 16
OS_IOP_MMHG: 16

## 2024-11-14 ASSESSMENT — CONFRONTATIONAL VISUAL FIELD TEST (CVF)
OD_FINDINGS: FULL
OS_FINDINGS: FULL

## 2024-11-14 ASSESSMENT — LID EXAM ASSESSMENTS
OS_BLEPHARITIS: LLL LUL 2+
OD_BLEPHARITIS: RLL RUL 2+

## 2024-11-14 ASSESSMENT — VISUAL ACUITY
OD_BCVA: 20/25
OS_BCVA: 20/25

## 2024-12-13 ENCOUNTER — APPOINTMENT (OUTPATIENT)
Dept: DERMATOLOGY | Facility: CLINIC | Age: 59
End: 2024-12-13
Payer: COMMERCIAL

## 2024-12-13 PROCEDURE — 99213 OFFICE O/P EST LOW 20 MIN: CPT

## 2025-05-13 ENCOUNTER — APPOINTMENT (OUTPATIENT)
Dept: DERMATOLOGY | Facility: CLINIC | Age: 60
End: 2025-05-13
Payer: COMMERCIAL

## 2025-05-13 PROCEDURE — 99213 OFFICE O/P EST LOW 20 MIN: CPT

## 2025-08-11 ENCOUNTER — APPOINTMENT (OUTPATIENT)
Dept: DERMATOLOGY | Facility: CLINIC | Age: 60
End: 2025-08-11
Payer: COMMERCIAL

## 2025-08-11 PROCEDURE — 99213 OFFICE O/P EST LOW 20 MIN: CPT | Mod: 25

## 2025-08-11 PROCEDURE — 17000 DESTRUCT PREMALG LESION: CPT

## 2025-08-11 PROCEDURE — 17003 DESTRUCT PREMALG LES 2-14: CPT

## 2025-09-08 ENCOUNTER — APPOINTMENT (OUTPATIENT)
Dept: HEMATOLOGY ONCOLOGY | Facility: CLINIC | Age: 60
End: 2025-09-08